# Patient Record
(demographics unavailable — no encounter records)

---

## 2020-03-09 NOTE — NUR
Patient triaged and placed in waiting room. VSS and patient appears in no acute 
distress at this time. Accompanied by father in law, awaiting available bed, 
and MD notified of need for MSE.

## 2020-03-09 NOTE — NUR
Patient given written and verbal discharge instructions and verbalizes 
understanding.  ER MD discussed with patient the results and treatment 
provided. Patient in stable condition. ID arm band removed. IV catheter removed 
intact and dressing applied, no active bleeding. Rx of Antivert given. Patient 
educated on pain management and to follow up with PMD. Pain Scale 0/10 
Opportunity for questions provided and answered. Medication side effect fact 
sheet provided.

## 2020-03-09 NOTE — NUR
Pt BIB family to ED seeking evaluation of gradual onset of constant, dizziness 
associated with intermittent, non-radiating, right frontal headaches since 
3:30pm today. The patient describes her dizziness as if the room was spinning. 
She states her dizziness is made worse with standing up. She denies any prior 
episodes of similar symptoms